# Patient Record
Sex: FEMALE | URBAN - METROPOLITAN AREA
[De-identification: names, ages, dates, MRNs, and addresses within clinical notes are randomized per-mention and may not be internally consistent; named-entity substitution may affect disease eponyms.]

---

## 2024-05-19 ENCOUNTER — CLAIMS CREATED FROM THE CLAIM WINDOW (OUTPATIENT)
Dept: URBAN - METROPOLITAN AREA MEDICAL CENTER 43 | Facility: MEDICAL CENTER | Age: 74
End: 2024-05-19
Payer: MEDICARE

## 2024-05-19 DIAGNOSIS — Z79.01 ADEQUATE ANTICOAGULATION ON ANTICOAGULANT THERAPY: ICD-10-CM

## 2024-05-19 DIAGNOSIS — K92.1 MELENA: ICD-10-CM

## 2024-05-19 PROCEDURE — 99223 1ST HOSP IP/OBS HIGH 75: CPT | Performed by: INTERNAL MEDICINE

## 2024-05-20 ENCOUNTER — CLAIMS CREATED FROM THE CLAIM WINDOW (OUTPATIENT)
Dept: URBAN - METROPOLITAN AREA MEDICAL CENTER 43 | Facility: MEDICAL CENTER | Age: 74
End: 2024-05-20
Payer: MEDICARE

## 2024-05-20 DIAGNOSIS — K31.819 ACQUIRED ARTERIOVENOUS MALFORMATION OF DUODENUM: ICD-10-CM

## 2024-05-20 DIAGNOSIS — K25.9 ANTRAL ULCER: ICD-10-CM

## 2024-05-20 PROCEDURE — 43235 EGD DIAGNOSTIC BRUSH WASH: CPT | Performed by: INTERNAL MEDICINE

## 2024-05-21 ENCOUNTER — CLAIMS CREATED FROM THE CLAIM WINDOW (OUTPATIENT)
Dept: URBAN - METROPOLITAN AREA MEDICAL CENTER 43 | Facility: MEDICAL CENTER | Age: 74
End: 2024-05-21
Payer: MEDICARE

## 2024-05-21 DIAGNOSIS — K92.1 ACUTE MELENA: ICD-10-CM

## 2024-05-21 DIAGNOSIS — D12.0 ADENOMA OF CECUM: ICD-10-CM

## 2024-05-21 DIAGNOSIS — D12.2 ADENOMA OF ASCENDING COLON: ICD-10-CM

## 2024-05-21 PROCEDURE — 45385 COLONOSCOPY W/LESION REMOVAL: CPT | Performed by: INTERNAL MEDICINE

## 2024-05-23 ENCOUNTER — CLAIMS CREATED FROM THE CLAIM WINDOW (OUTPATIENT)
Dept: URBAN - METROPOLITAN AREA MEDICAL CENTER 43 | Facility: MEDICAL CENTER | Age: 74
End: 2024-05-23
Payer: MEDICARE

## 2024-05-23 DIAGNOSIS — K25.9 ANTRAL ULCER: ICD-10-CM

## 2024-05-23 DIAGNOSIS — D62 ABLA (ACUTE BLOOD LOSS ANEMIA): ICD-10-CM

## 2024-05-23 DIAGNOSIS — Z79.01 ADEQUATE ANTICOAGULATION ON ANTICOAGULANT THERAPY: ICD-10-CM

## 2024-05-23 PROCEDURE — 99232 SBSQ HOSP IP/OBS MODERATE 35: CPT | Performed by: PHYSICIAN ASSISTANT

## 2024-05-28 ENCOUNTER — TELEPHONE ENCOUNTER (OUTPATIENT)
Dept: URBAN - METROPOLITAN AREA CLINIC 113 | Facility: CLINIC | Age: 74
End: 2024-05-28

## 2024-09-18 ENCOUNTER — DASHBOARD ENCOUNTERS (OUTPATIENT)
Age: 74
End: 2024-09-18

## 2024-09-18 ENCOUNTER — OFFICE VISIT (OUTPATIENT)
Dept: URBAN - METROPOLITAN AREA CLINIC 113 | Facility: CLINIC | Age: 74
End: 2024-09-18
Payer: OTHER GOVERNMENT

## 2024-09-18 VITALS
BODY MASS INDEX: 24.47 KG/M2 | WEIGHT: 174.8 LBS | RESPIRATION RATE: 18 BRPM | SYSTOLIC BLOOD PRESSURE: 118 MMHG | DIASTOLIC BLOOD PRESSURE: 55 MMHG | TEMPERATURE: 97.9 F | HEIGHT: 71 IN | HEART RATE: 98 BPM

## 2024-09-18 DIAGNOSIS — K25.9 GASTRIC ULCER: ICD-10-CM

## 2024-09-18 DIAGNOSIS — K21.9 GERD: ICD-10-CM

## 2024-09-18 DIAGNOSIS — R14.3 EXCESSIVE GAS: ICD-10-CM

## 2024-09-18 DIAGNOSIS — D50.8 ACHLORHYDRIC ANEMIA: ICD-10-CM

## 2024-09-18 PROCEDURE — 99214 OFFICE O/P EST MOD 30 MIN: CPT | Performed by: NURSE PRACTITIONER

## 2024-09-18 RX ORDER — FUROSEMIDE 40 MG/1
1 TABLET TABLET ORAL ONCE A DAY
Status: ACTIVE | COMMUNITY

## 2024-09-18 RX ORDER — ROSUVASTATIN CALCIUM 10 MG/1
1 TABLET TABLET, COATED ORAL ONCE A DAY
Status: ACTIVE | COMMUNITY

## 2024-09-18 RX ORDER — HYDROXYUREA 500 MG/1
1 CAPSULE CAPSULE ORAL TWICE A DAY
Status: ACTIVE | COMMUNITY

## 2024-09-18 RX ORDER — APIXABAN 5 MG/1
1 TABLET TABLET, FILM COATED ORAL TWICE A DAY
Status: ACTIVE | COMMUNITY

## 2024-09-18 RX ORDER — METOPROLOL SUCCINATE 50 MG/1
1 TABLET TABLET, FILM COATED, EXTENDED RELEASE ORAL TWICE A DAY
Status: ACTIVE | COMMUNITY

## 2024-09-18 RX ORDER — PANTOPRAZOLE SODIUM 40 MG/1
1 TABLET TABLET, DELAYED RELEASE ORAL TWICE A DAY
OUTPATIENT

## 2024-09-18 RX ORDER — TESTOSTERONE CYPIONATE 200 MG/ML
1 ML INJECTION INTRAMUSCULAR
Status: ACTIVE | COMMUNITY

## 2024-09-18 RX ORDER — FOLIC ACID 1 MG/1
1 TABLET TABLET ORAL ONCE A DAY
Status: ACTIVE | COMMUNITY

## 2024-09-18 RX ORDER — LOSARTAN POTASSIUM 25 MG/1
1 TABLET TABLET, FILM COATED ORAL TWICE A DAY
Status: ACTIVE | COMMUNITY

## 2024-09-18 RX ORDER — PANTOPRAZOLE SODIUM 40 MG/1
1 TABLET TABLET, DELAYED RELEASE ORAL TWICE A DAY
Status: ACTIVE | COMMUNITY

## 2024-09-18 NOTE — HPI-TODAY'S VISIT:
73-year-old male with a history of hypertension, atrial fibrillation on Eliquis, congestive heart failure, JAK2 positive myeloproliferative disorder, dyslipidemia, type 2 diabetes, chronic kidney disease, GERD, presenting for follow-up after previous hospitalization. He was seen in hospital consultation by Dr. Mahmood in May for evaluation of melena and anemia, with a decline in his hemoglobin to 5.8.  He was started on IV PPI and transfused to a target hemoglobin of 7-8, with plan for endoscopic evaluation of subacute gastrointestinal bleeding.  CT angiography was considered. Colonoscopy 5/21/2024 by Dr. Mahmood:Diverticulosis in the sigmoid, descending and transverse colon, an 18 mm polyp in the ascending colon, a 7 mm polyp in the cecum.  Pathology showed tubular adenoma.  A repeat colonoscopy was recommended in 1 year. EGD 5/20/2024 by Dr. Mahmood:Normal esophagus, nonbleeding gastric ulcer, normal examined duodenum.  No specimens were collected. His only complaint is of excessive gas, which he attributes to consumption of sugar.  He otherwise denies localized abdominal pain, nausea or vomiting.  His GERD symptoms are managed with once daily pantoprazole.  His bowel habits are regular without red blood per rectum or melena.  Recent labs with his PCP showed an improvement in his hemoglobin to 11.1.

## 2025-03-18 ENCOUNTER — OFFICE VISIT (OUTPATIENT)
Dept: URBAN - METROPOLITAN AREA CLINIC 113 | Facility: CLINIC | Age: 75
End: 2025-03-18
Payer: OTHER GOVERNMENT

## 2025-03-18 ENCOUNTER — LAB OUTSIDE AN ENCOUNTER (OUTPATIENT)
Dept: URBAN - METROPOLITAN AREA CLINIC 113 | Facility: CLINIC | Age: 75
End: 2025-03-18

## 2025-03-18 ENCOUNTER — OFFICE VISIT (OUTPATIENT)
Dept: URBAN - METROPOLITAN AREA CLINIC 113 | Facility: CLINIC | Age: 75
End: 2025-03-18

## 2025-03-18 VITALS
RESPIRATION RATE: 18 BRPM | SYSTOLIC BLOOD PRESSURE: 122 MMHG | TEMPERATURE: 97 F | WEIGHT: 175 LBS | HEIGHT: 71 IN | DIASTOLIC BLOOD PRESSURE: 67 MMHG | BODY MASS INDEX: 24.5 KG/M2 | HEART RATE: 112 BPM

## 2025-03-18 DIAGNOSIS — K62.5 BRIGHT RED BLOOD PER RECTUM: ICD-10-CM

## 2025-03-18 DIAGNOSIS — K59.09 CHRONIC CONSTIPATION: ICD-10-CM

## 2025-03-18 DIAGNOSIS — K25.9 GASTRIC ULCER WITHOUT HEMORRHAGE OR PERFORATION, UNSPECIFIED CHRONICITY: ICD-10-CM

## 2025-03-18 DIAGNOSIS — Z86.0101 HISTORY OF ADENOMATOUS POLYP OF COLON: ICD-10-CM

## 2025-03-18 DIAGNOSIS — D50.0 IRON DEFICIENCY ANEMIA DUE TO CHRONIC BLOOD LOSS: ICD-10-CM

## 2025-03-18 PROBLEM — 73481001: Status: ACTIVE | Noted: 2025-03-18

## 2025-03-18 PROBLEM — 429047008: Status: ACTIVE | Noted: 2025-03-18

## 2025-03-18 PROBLEM — 236069009: Status: ACTIVE | Noted: 2025-03-18

## 2025-03-18 PROBLEM — 724556004: Status: ACTIVE | Noted: 2025-03-18

## 2025-03-18 PROBLEM — 74474003: Status: ACTIVE | Noted: 2025-03-18

## 2025-03-18 PROCEDURE — 99214 OFFICE O/P EST MOD 30 MIN: CPT | Performed by: NURSE PRACTITIONER

## 2025-03-18 RX ORDER — FUROSEMIDE 40 MG/1
1 TABLET TABLET ORAL ONCE A DAY
Status: ACTIVE | COMMUNITY

## 2025-03-18 RX ORDER — OMEPRAZOLE 20 MG/1
1 CAPSULE 1/2 TO 1 HOUR BEFORE MORNING MEAL CAPSULE, DELAYED RELEASE ORAL ONCE A DAY
Status: ACTIVE | COMMUNITY

## 2025-03-18 RX ORDER — PANTOPRAZOLE SODIUM 40 MG/1
1 TABLET TABLET, DELAYED RELEASE ORAL TWICE A DAY
Status: ACTIVE | COMMUNITY

## 2025-03-18 RX ORDER — PANTOPRAZOLE SODIUM 40 MG/1
1 TABLET TABLET, DELAYED RELEASE ORAL TWICE A DAY
Status: DISCONTINUED | COMMUNITY

## 2025-03-18 RX ORDER — LOSARTAN POTASSIUM 25 MG/1
1 TABLET TABLET, FILM COATED ORAL TWICE A DAY
Status: DISCONTINUED | COMMUNITY

## 2025-03-18 RX ORDER — TESTOSTERONE CYPIONATE 200 MG/ML
1 ML INJECTION INTRAMUSCULAR
Status: ACTIVE | COMMUNITY

## 2025-03-18 RX ORDER — FOLIC ACID 1 MG/1
1 TABLET TABLET ORAL ONCE A DAY
Status: ACTIVE | COMMUNITY

## 2025-03-18 RX ORDER — METOPROLOL SUCCINATE 100 MG/1
2 1/2 TABLETS TABLET, FILM COATED, EXTENDED RELEASE ORAL ONCE DAILY
Status: ACTIVE | COMMUNITY

## 2025-03-18 RX ORDER — METOPROLOL SUCCINATE 50 MG/1
1 TABLET TABLET, FILM COATED, EXTENDED RELEASE ORAL TWICE A DAY
Status: ACTIVE | COMMUNITY

## 2025-03-18 RX ORDER — ROSUVASTATIN CALCIUM 10 MG/1
1 TABLET TABLET, COATED ORAL ONCE A DAY
Status: ACTIVE | COMMUNITY

## 2025-03-18 RX ORDER — LOSARTAN POTASSIUM 25 MG/1
1 TABLET TABLET, FILM COATED ORAL TWICE A DAY
Status: ACTIVE | COMMUNITY

## 2025-03-18 RX ORDER — HYDROXYUREA 500 MG/1
1 CAPSULE CAPSULE ORAL TWICE A DAY
Status: DISCONTINUED | COMMUNITY

## 2025-03-18 RX ORDER — FERROUS SULFATE 325(65) MG
1 TABLET TABLET ORAL
Status: ACTIVE | COMMUNITY

## 2025-03-18 RX ORDER — HYDROXYUREA 500 MG/1
1 CAPSULE CAPSULE ORAL TWICE A DAY
Status: ACTIVE | COMMUNITY

## 2025-03-18 RX ORDER — APIXABAN 5 MG/1
1 TABLET TABLET, FILM COATED ORAL TWICE A DAY
Status: ACTIVE | COMMUNITY

## 2025-03-18 RX ORDER — SPIRONOLACTONE 25 MG/1
1 TABLET TABLET, FILM COATED ORAL
Status: ACTIVE | COMMUNITY

## 2025-03-18 RX ORDER — GLIMEPIRIDE 2 MG/1
1 TABLET WITH BREAKFAST OR THE FIRST MAIN MEAL OF THE DAY TABLET ORAL ONCE A DAY
Status: ACTIVE | COMMUNITY

## 2025-03-18 NOTE — HPI-OTHER HISTORIES
Labs  2/24/2025 CBC:WBC 8.3, hemoglobin 11.7, , platelet 270. BMP normal with exception of glucose 107, creatinine 1.28, GFR 59, chloride 107. LFTs: TB 0.8, ALP 89, ALT 10, AST 14. Cholesterol panel normal with exception of cholesterol 93, HDL 34. PSA 1.5. A1c 5.2. TSH 2.730. Free T41.35. Vitamin D 25-hydroxy 7.9.  9/29/2024 labs include CBC:WBC 3.74, hemoglobin 11.1, MCV 97, platelet 81.  Colonoscopy 5/21/2024:BBPS 8, sigmoid/descending/transverse diverticulosis, removal of an 18 mm ascending tubular adenoma, removal of a 7 mm cecal tubular adenoma. Surveillance recommended in 1 year.  EGD 5/20/2024:Normal esophagus, nonbleeding gastric ulcer with no stigmata of bleeding, normal examined duodenum, no specimens collected.

## 2025-03-18 NOTE — HPI-TODAY'S VISIT:
This is a 74-year-old male with a history of hypertension, atrial fibrillation on Eliquis, congestive heart failure, JAK2 positive myeloproliferative disorder, dyslipidemia, type 2 diabetes, chronic kidney disease, GERD, excessive gas, acute on chronic blood loss anemia, and adenomatous colon polyps due surveillance in May 2025 presenting for follow-up.  He was last seen 9/18/2024 for hospital follow-up regarding acute on chronic blood loss anemia.  It was discussed that anemia was likely multifactorial associated with a gastric ulcer and anemia of chronic disease.  Recent labs showed stabilization of hemoglobin.  He was to continue pantoprazole 40 mg twice a day for GI prophylaxis and monitor his hemoglobin with his primary care physician.  Repeat EGD and possible small bowel evaluation with capsule were considerations for marked decline in hemoglobin.  He complained of excessive gas.  He was given information on a low FODMAP diet and instructed to begin a trial of IBgard.  Due to removal of a large ascending tubular adenoma, surveillance colonoscopy recommended in 1 year.  For the last 6 to 9 months ago, he reports 1-2 bowel movements per day.  His stools have been hard and he has had to strain.  He recently started Colace and reports some improvement.  Twice a week, he will have a sudden urge for a bowel movement and experienced fecal incontinence.  This usually occurs after eating.  For the last 3 weeks, he has noted sporadic blood per rectum describing bright red blood on the stool associated with intermittent proctalgia.  He is unsure regarding swelling.  He admits intermittent excessive gas. He is taking pantoprazole 40 mg daily.  He denies heartburn, regurgitation, dysphagia, or abdominal pain.  He reports intermittent nausea in the last 7 to 10 days.  He denies vomiting. A few weeks ago, he had frequent diarrhea.  He was evaluated at [facility] and informed that he had an intestinal virus. He reports labs showing anemia with iron deficiency [facility].  He also sees Dr. Warner in [place] for primary care.  He has an iron infusion scheduled next week.  He reports a chronic history of congestive heart failure.  He is under the care of Dr. Hicks.  He also has atrial fibrillation.  His last visit was 4 months ago and all was stable.  In the last 3 weeks, he reports dyspnea on exertion if he climbs more than 6-8 stairs or takes a long walk.  He denies orthopnea.  He has experienced swelling in his feet and ankles.  He denies chest pain.  Due to unexplained weight loss, a PET CT [facility] through [facility].  This has not been scheduled.  According to our records, his weight is stable.

## 2025-03-26 ENCOUNTER — TELEPHONE ENCOUNTER (OUTPATIENT)
Dept: URBAN - METROPOLITAN AREA CLINIC 113 | Facility: CLINIC | Age: 75
End: 2025-03-26

## 2025-03-26 RX ORDER — POLYETHYLENE GLYCOL 3350, SODIUM CHLORIDE, SODIUM BICARBONATE, POTASSIUM CHLORIDE 420; 11.2; 5.72; 1.48 G/4L; G/4L; G/4L; G/4L
AS DIRECTED POWDER, FOR SOLUTION ORAL
Qty: 1 BOTTLE | Refills: 0 | OUTPATIENT
Start: 2025-03-26 | End: 2025-03-27